# Patient Record
Sex: MALE | URBAN - METROPOLITAN AREA
[De-identification: names, ages, dates, MRNs, and addresses within clinical notes are randomized per-mention and may not be internally consistent; named-entity substitution may affect disease eponyms.]

---

## 2020-09-03 ENCOUNTER — APPOINTMENT (RX ONLY)
Dept: URBAN - METROPOLITAN AREA CLINIC 307 | Facility: CLINIC | Age: 55
Setting detail: DERMATOLOGY
End: 2020-09-03

## 2020-09-03 VITALS — TEMPERATURE: 97.8 F

## 2020-09-03 DIAGNOSIS — M32.10 SYSTEMIC LUPUS ERYTHEMATOSUS, ORGAN OR SYSTEM INVOLVEMENT UNSPECIFIED: ICD-10-CM | Status: INADEQUATELY CONTROLLED

## 2020-09-03 PROCEDURE — ? PRESCRIPTION

## 2020-09-03 PROCEDURE — ? ORDER TESTS

## 2020-09-03 PROCEDURE — 99202 OFFICE O/P NEW SF 15 MIN: CPT

## 2020-09-03 PROCEDURE — ? ADDITIONAL NOTES

## 2020-09-03 RX ORDER — CLOBETASOL PROPIONATE 0.5 MG/G
OINTMENT TOPICAL
Qty: 1 | Refills: 2 | Status: CANCELLED

## 2020-09-03 RX ORDER — PREDNISONE 10 MG/1
TABLET ORAL
Qty: 105 | Refills: 0 | Status: CANCELLED

## 2020-09-03 ASSESSMENT — LOCATION DETAILED DESCRIPTION DERM
LOCATION DETAILED: RIGHT CENTRAL MALAR CHEEK
LOCATION DETAILED: LEFT DISTAL DORSAL FOREARM
LOCATION DETAILED: LEFT POPLITEAL SKIN
LOCATION DETAILED: LEFT SUPERIOR LATERAL BUCCAL CHEEK
LOCATION DETAILED: RIGHT DISTAL DORSAL FOREARM

## 2020-09-03 ASSESSMENT — LOCATION ZONE DERM
LOCATION ZONE: FACE
LOCATION ZONE: LEG
LOCATION ZONE: ARM

## 2020-09-03 ASSESSMENT — LOCATION SIMPLE DESCRIPTION DERM
LOCATION SIMPLE: LEFT CHEEK
LOCATION SIMPLE: RIGHT CHEEK
LOCATION SIMPLE: LEFT POPLITEAL SKIN
LOCATION SIMPLE: LEFT FOREARM
LOCATION SIMPLE: RIGHT FOREARM

## 2020-09-03 NOTE — PROCEDURE: ADDITIONAL NOTES
Additional Notes: Previously diagnosed with SLE by pcp.  - exposed to constant sun light. Recommended physical sunblock/spf clothing daily. Recommend prednisone taper starting at 60 mg daily x 5 days, tapering down in 5 day increments. Called pharmacy and prescribed 1lb jar of triamcinolone to apply bid x 2 weeks. Screening labs to begin plaquenil - discussed risks and need for yearly retinal examinations. Information given for rheumatology. Recommend follow up with rheumatology for systemic monitoring.
Detail Level: Simple

## 2020-09-04 ENCOUNTER — RX ONLY (OUTPATIENT)
Age: 55
Setting detail: RX ONLY
End: 2020-09-04

## 2020-09-04 RX ORDER — PREDNISONE 10 MG/1
TABLET ORAL
Qty: 105 | Refills: 0 | Status: ERX | COMMUNITY
Start: 2020-09-04

## 2020-09-04 RX ORDER — TRIAMCINOLONE ACETONIDE 1 MG/G
CREAM TOPICAL
Qty: 454 | Refills: 0 | Status: ERX | COMMUNITY
Start: 2020-09-04

## 2020-10-19 ENCOUNTER — RX ONLY (OUTPATIENT)
Age: 55
Setting detail: RX ONLY
End: 2020-10-19

## 2020-10-19 RX ORDER — HYDROXYCHLOROQUINE SULFATE 200 MG/1
TABLET ORAL
Qty: 60 | Refills: 2 | Status: ERX | COMMUNITY
Start: 2020-10-19